# Patient Record
(demographics unavailable — no encounter records)

---

## 2018-08-13 NOTE — OP
DATE OF PROCEDURE:  08/13/2018

 

PREOPERATIVE DIAGNOSES:  Neurogenic bladder, bladder neck obstruction.

 

POSTOPERATIVE DIAGNOSES:  Neurogenic bladder, bladder neck obstruction.

 

PROCEDURE PERFORMED:  Cysto, laser VIU.

 

SURGEON:  Dr. Billy Nunez.

 

ANESTHETIC:  General.

 

ESTIMATED BLOOD LOSS:  Minimal.

 

DRAINS PLACED:  20 Portuguese Rodriguez catheter.

 

FINDINGS:  He had no evidence of anterior urethral stricture disease.  He had some narrowing of the b
ladder neck region with minimal BPH regrowth.  We lasered the bladder neck to get it open so a 22 Robinson
nch scope easily went in and out.  The bladder itself was examined with both 30 and 70 degree lens.  
There was no tumor, foreign body, or stone.  There were 2 ureteral orifices in normal position.

 

DRAINS PLACED:  A 20 Portuguese Rodriguez catheter with a 10 mL balloon.  We placed 20 mL in the balloon.

 

OPERATIVE TECHNIQUE:  After obtaining written and verbal consent from the patient after receiving IV 
antibiotics, he was taken to the operating suite.  He was placed in supine position on the treatment 
table.  PlexiPulses were placed on his lower extremities and turned on.  He was given a general anest
hetic and oral intubation.  He was placed in the dorsal lithotomy position and sterilely prepped and 
draped for cystoscopy.  Cystoscopy was done with a 22-Portuguese sheath.  This was well lubricated, passe
d down to the level of the bladder neck which was mildly tight to it.  He had had a small caliber cat
heter in for a few days, so we were able to get the catheter through and then we examined the bladder
 with the 30 and 70-degree lens with the findings above.  We then went back to the 30 degree lens and
 with the aid of a video camera and monitor  used the Holmium laser fiber to open up the scarred area
 of the bladder neck.  At this point a Rodriguez catheter was placed and the balloon was inflated.  It wa
s draining clear urine and set up to a leg bag and he was taken out of the dorsal lithotomy position,
 awakened and extubated and taken by stretcher to the recovery room.

## 2019-01-18 NOTE — RAD
CHEST TWO VIEWS:

 

HISTORY:

Dyspnea.

 

COMPARISON:

12/10/2012

 

FINDINGS:

Postop midline sternotomy.  Minimal stable increased linear and interstitial markings bilaterally wit
h some biapical pleural thickening.  No confluent pneumonia, overt edema, pleural effusion, or other 
acute process.

 

IMPRESSION:

1.  Minimal stable chronic changes.

 

2.  Postoperative midline sternotomy.

 

3.  Atherosclerosis of the aorta.

 

4.  No acute process.

 

POS: [facility]